# Patient Record
Sex: MALE | Race: WHITE
[De-identification: names, ages, dates, MRNs, and addresses within clinical notes are randomized per-mention and may not be internally consistent; named-entity substitution may affect disease eponyms.]

---

## 2017-02-27 PROBLEM — Z00.00 ENCOUNTER FOR PREVENTIVE HEALTH EXAMINATION: Status: ACTIVE | Noted: 2017-02-27

## 2018-05-29 ENCOUNTER — APPOINTMENT (OUTPATIENT)
Dept: PEDIATRIC ALLERGY IMMUNOLOGY | Facility: CLINIC | Age: 48
End: 2018-05-29
Payer: COMMERCIAL

## 2018-05-29 ENCOUNTER — NON-APPOINTMENT (OUTPATIENT)
Age: 48
End: 2018-05-29

## 2018-05-29 VITALS
WEIGHT: 187.79 LBS | BODY MASS INDEX: 28.46 KG/M2 | DIASTOLIC BLOOD PRESSURE: 78 MMHG | HEART RATE: 68 BPM | SYSTOLIC BLOOD PRESSURE: 118 MMHG | HEIGHT: 68.11 IN

## 2018-05-29 DIAGNOSIS — J30.89 OTHER ALLERGIC RHINITIS: ICD-10-CM

## 2018-05-29 DIAGNOSIS — R21 RASH AND OTHER NONSPECIFIC SKIN ERUPTION: ICD-10-CM

## 2018-05-29 DIAGNOSIS — E78.00 PURE HYPERCHOLESTEROLEMIA, UNSPECIFIED: ICD-10-CM

## 2018-05-29 DIAGNOSIS — H10.13 ACUTE ATOPIC CONJUNCTIVITIS, BILATERAL: ICD-10-CM

## 2018-05-29 DIAGNOSIS — J30.1 ALLERGIC RHINITIS DUE TO POLLEN: ICD-10-CM

## 2018-05-29 DIAGNOSIS — R05 COUGH: ICD-10-CM

## 2018-05-29 DIAGNOSIS — J30.9 ALLERGIC RHINITIS, UNSPECIFIED: ICD-10-CM

## 2018-05-29 PROCEDURE — 95004 PERQ TESTS W/ALRGNC XTRCS: CPT

## 2018-05-29 PROCEDURE — 99244 OFF/OP CNSLTJ NEW/EST MOD 40: CPT | Mod: 25

## 2018-05-29 PROCEDURE — 94010 BREATHING CAPACITY TEST: CPT

## 2018-05-29 RX ORDER — CETIRIZINE HYDROCHLORIDE 10 MG/1
10 TABLET, COATED ORAL
Qty: 1 | Refills: 5 | Status: ACTIVE | COMMUNITY
Start: 2018-05-29 | End: 1900-01-01

## 2018-05-29 RX ORDER — AZELASTINE HYDROCHLORIDE 0.5 MG/ML
0.05 SOLUTION/ DROPS OPHTHALMIC TWICE DAILY
Qty: 1 | Refills: 3 | Status: ACTIVE | COMMUNITY
Start: 2018-05-29 | End: 1900-01-01

## 2018-05-29 RX ORDER — HYDROCORTISONE 10 MG/G
1 CREAM TOPICAL TWICE DAILY
Qty: 1 | Refills: 3 | Status: ACTIVE | COMMUNITY
Start: 2018-05-29 | End: 1900-01-01

## 2018-05-29 RX ORDER — ROSUVASTATIN CALCIUM 5 MG/1
5 TABLET, FILM COATED ORAL DAILY
Qty: 90 | Refills: 3 | Status: ACTIVE | COMMUNITY
Start: 2018-05-29

## 2018-05-29 RX ORDER — FLUTICASONE PROPIONATE 50 UG/1
50 SPRAY, METERED NASAL DAILY
Qty: 1 | Refills: 3 | Status: ACTIVE | COMMUNITY
Start: 2018-05-29 | End: 1900-01-01

## 2020-07-23 ENCOUNTER — EMERGENCY (EMERGENCY)
Facility: HOSPITAL | Age: 50
LOS: 1 days | Discharge: HOME | End: 2020-07-23
Attending: EMERGENCY MEDICINE
Payer: COMMERCIAL

## 2020-07-23 VITALS
RESPIRATION RATE: 17 BRPM | OXYGEN SATURATION: 96 % | WEIGHT: 195.11 LBS | DIASTOLIC BLOOD PRESSURE: 86 MMHG | HEART RATE: 82 BPM | TEMPERATURE: 98 F | HEIGHT: 67 IN | SYSTOLIC BLOOD PRESSURE: 124 MMHG

## 2020-07-23 PROCEDURE — 99283 EMERGENCY DEPT VISIT LOW MDM: CPT

## 2020-07-23 NOTE — ED ADULT TRIAGE NOTE - PAIN RATING/NUMBER SCALE (0-10): ACTIVITY

## 2020-07-23 NOTE — ED ADULT TRIAGE NOTE - CHIEF COMPLAINT QUOTE
bug bite x 2 weeks from unknown insect. Took bactrim x 10 days, finished course. Redness started at site of bug bite after finishing course.

## 2020-07-24 NOTE — ED PROVIDER NOTE - NSFOLLOWUPINSTRUCTIONS_ED_ALL_ED_FT
Cellulitis    Cellulitis is a skin infection caused by bacteria. This condition occurs most often in the arms and lower legs but can occur anywhere over the body. Symptoms include redness, swelling, warm skin, tenderness, and chills/fever. If you were prescribed an antibiotic medicine, take it as told by your health care provider. Do not stop taking the antibiotic even if you start to feel better.    SEEK IMMEDIATE MEDICAL CARE IF YOU HAVE ANY OF THE FOLLOWING SYMPTOMS: worsening fever, red streaks coming from affected area, vomiting or diarrhea, or dizziness/lightheadedness.     -Please take doxycycline 100mg twice a day for 1 wk AND cephalexin 500mg 4 x a day for 1 wk    -Please follow up with your Primary Care Doctor within 1 wk

## 2020-07-24 NOTE — ED ADULT NURSE NOTE - OBJECTIVE STATEMENT
49 YO male with PMH    via walk in presenting with complaints of redness.   Pt states that 2 weeks ago, pt noticed redness to the left lateral side of thigh. Pt suspects that he was bit by a bug, but of unknown bug. Pt states that he was prescribed Levofloxacin, where he developed allergic reaction described as rash to antibiotic, which was relieved by Benadryl. Pt was then prescribed Bactrim by dermatologist, and finished his course, which then relieved rash. Pt states that today, when he noted a return of redness to the left thigh, pt saw PA at dermatologist, and was prescribed Keflex.   Pt had COVID antibiotic testing today. Pt denies chest pain, shortness of breath, visual disturbances, numbness/tingling, fever, chills, diaphoresis, headache, nausea, vomiting, constipation, diarrhea, or urinary symptoms.    Pt Axox4, gross neuro intact, PERRL 3 mm. Lungs clear throughout bilateral. S1S2 heard. Abdomen soft, non-tender, non-distended. Skin warm, dry, and intact, besides redness, swelling, and itching to the lateral aspect of the left thigh. Pt placed in position of comfort. Pt educated on call bell system and provided call bell. Bed in lowest position, wheels locked, appropriate side rails raised. Pt denies needs at this time.

## 2020-07-24 NOTE — ED PROVIDER NOTE - NS ED ROS FT
GENERAL: denies fever, chills  HEENT: denies headaches, dizziness, ear pain, vision changes, congestion, dysphagia  CARDIAC: denies chest pain, palpitations  PULM: denies SOB, cough  GI: denies abdominal pain, nausea, vomiting, diarrhea, constipation, hematochezia  : denies dysuria, frequency, incontinence, hematuria  NEURO: denies motor weakness, sensory changes  MSK: denies joint, muscle pain  SKIN: +new rashes; denies hives  HEME: denies active bleeding, bruising

## 2020-07-24 NOTE — ED PROVIDER NOTE - OBJECTIVE STATEMENT
51y/o M w/ no medical problems p/w rash. Pt states he noticed "bug bite" on R leg he noticed, called PMD who prescribed fluoroquinolone but pt developed hives on arm. Pt then took 10d course of bactrim and rash disappeared quickly but now returning. Saw dermatologist who prescribed doxycycline and tested for Lyme, awaiting results. PT was also given cephalexin but did not take meds. Denies fevers, chills, joint aches, palpitations, chest pain, sob, abdominal pain, numbness, tingling, facial droop.

## 2020-07-24 NOTE — ED PROVIDER NOTE - PHYSICAL EXAMINATION
GENERAL: well appearing in no acute distress, non-toxic appearing  HEAD: normocephalic, atraumatic  HEENT: normal conjunctiva, oral mucosa moist, uvula midline, no tonsilar exudates, neck supple, no JVD  CARDIAC: regular rate and rhythm, normal S1S2, no appreciable murmurs, 2+ pulses in UE/LE b/l  PULM: normal breath sounds, clear to ascultation bilaterally, no rales, rhonchi, wheezing  GI: abdomen nondistended, soft, nontender, no guarding, rebound tenderness  : no CVA tenderness b/l, no suprapubic tenderness  NEURO: no focal motor or sensory deficits, CN2-12 intact, normal speech, PERRLA, EOMI, normal gait, AAOx3  MSK: no peripheral edema, no calf tenderness b/l  SKIN: erythematous blanching rash covering most of R thigh well-perfused  PSYCH: appropriate mood and affect

## 2020-07-24 NOTE — ED PROVIDER NOTE - CLINICAL SUMMARY MEDICAL DECISION MAKING FREE TEXT BOX
51y/o M w/ no medical problems p/w rash on R thigh s/p abx course with no evidence of abscess. Well appearing and hemodynamically stable. Will d/c home with current medications.

## 2020-07-24 NOTE — ED PROVIDER NOTE - ATTENDING CONTRIBUTION TO CARE
Attending Statement (FRANKI Zuniga MD):    HPI: 51y/o M with no reported PMH, presenting with redness of the right outer thigh; initially improved after taking bactrim but now recurred; warm to touch; no fever/chills, able to walk normally on leg without difficulty.  No weakness/numbness.  No h/o DM. No pus draining from leg.    Review of Systems:  -General: no fever or chills  -ENT: no congestion, no difficulty swallowing  -Pulmonary: no cough, no shortness of breath  -Cardiac: no chest pain, no palpitations  -Gastrointestinal: no abdominal pain, no nausea, no vomiting, and no diarrhea.  -Genitourinary: no blood or pain with urination  -Musculoskeletal: no back or neck pain  -Skin: see hpi  -Endocrine: No h/o diabetes or thyroid disease  -Neurologic: No focal weakness or numbness    PSH/PMH as noted above    On Physical Exam:  General: well appearing, in NAD, speaking clearly in full sentences and without difficulty; cooperative with exam  HEENT: airway patent  Skin: erythema of right lateral thigh; no fluctuance, no crepitance, minimally tender; ~7x3cm region, no streaking beyond erythematous region; no bullae or vesicles, no sloughing of skin, no petechiae.  Extremities: no peripheral edema, no gross deformities; FROM of R hip and knee.  Neuro: no gross neurologic deficits    MDM: cellulitis; refractory / partial response to bactrim; was prescribed keflex and doxycyline earlier today by his outpatient providers; not febrile or appearing septic - stable for dc, continue prescribed antibiotics, return precautions advised for any increase in redness, worsening pain, fever, or other symptoms.  Results of ED evaluation d/w patient, who verbalizes understanding of ED evaluation and discharge plan including medications, follow-up instructions and return precautions.

## 2020-08-03 PROBLEM — Z00.00 ENCOUNTER FOR PREVENTIVE HEALTH EXAMINATION: Status: ACTIVE | Noted: 2020-08-03

## 2020-08-20 ENCOUNTER — LABORATORY RESULT (OUTPATIENT)
Age: 50
End: 2020-08-20

## 2020-08-20 ENCOUNTER — APPOINTMENT (OUTPATIENT)
Dept: INFECTIOUS DISEASE | Facility: CLINIC | Age: 50
End: 2020-08-20
Payer: COMMERCIAL

## 2020-08-20 VITALS
BODY MASS INDEX: 30.92 KG/M2 | HEIGHT: 67 IN | SYSTOLIC BLOOD PRESSURE: 122 MMHG | WEIGHT: 197 LBS | OXYGEN SATURATION: 96 % | RESPIRATION RATE: 14 BRPM | TEMPERATURE: 98.2 F | DIASTOLIC BLOOD PRESSURE: 83 MMHG | HEART RATE: 66 BPM

## 2020-08-20 DIAGNOSIS — Z78.9 OTHER SPECIFIED HEALTH STATUS: ICD-10-CM

## 2020-08-20 DIAGNOSIS — A69.20 LYME DISEASE, UNSPECIFIED: ICD-10-CM

## 2020-08-20 DIAGNOSIS — B60.0 BABESIOSIS: ICD-10-CM

## 2020-08-20 DIAGNOSIS — Z20.828 CONTACT WITH AND (SUSPECTED) EXPOSURE TO OTHER VIRAL COMMUNICABLE DISEASES: ICD-10-CM

## 2020-08-20 DIAGNOSIS — Z80.0 FAMILY HISTORY OF MALIGNANT NEOPLASM OF DIGESTIVE ORGANS: ICD-10-CM

## 2020-08-20 DIAGNOSIS — E78.5 HYPERLIPIDEMIA, UNSPECIFIED: ICD-10-CM

## 2020-08-20 PROCEDURE — 99203 OFFICE O/P NEW LOW 30 MIN: CPT

## 2020-08-20 RX ORDER — ROSUVASTATIN CALCIUM 5 MG/1
5 TABLET, FILM COATED ORAL
Refills: 0 | Status: ACTIVE | COMMUNITY
Start: 2020-08-20

## 2020-08-21 LAB
BASOPHILS # BLD AUTO: 0.08 K/UL
BASOPHILS NFR BLD AUTO: 1.1 %
EOSINOPHIL # BLD AUTO: 0.23 K/UL
EOSINOPHIL NFR BLD AUTO: 3.1 %
HCT VFR BLD CALC: 50.2 %
HGB BLD-MCNC: 16.1 G/DL
IMM GRANULOCYTES NFR BLD AUTO: 0.7 %
LYMPHOCYTES # BLD AUTO: 2.21 K/UL
LYMPHOCYTES NFR BLD AUTO: 30.2 %
MAN DIFF?: NORMAL
MCHC RBC-ENTMCNC: 29.9 PG
MCHC RBC-ENTMCNC: 32.1 GM/DL
MCV RBC AUTO: 93.3 FL
MONOCYTES # BLD AUTO: 0.77 K/UL
MONOCYTES NFR BLD AUTO: 10.5 %
NEUTROPHILS # BLD AUTO: 3.99 K/UL
NEUTROPHILS NFR BLD AUTO: 54.4 %
PLATELET # BLD AUTO: 245 K/UL
RBC # BLD: 5.38 M/UL
RBC # FLD: 12.3 %
SARS-COV-2 IGG SERPL IA-ACNC: <0.1 INDEX
SARS-COV-2 IGG SERPL QL IA: NEGATIVE
WBC # FLD AUTO: 7.33 K/UL

## 2020-08-21 NOTE — ASSESSMENT
[FreeTextEntry1] : 50M with HLD came to office for recent lyme disease. He went hiking and biking in [place] and 2 weeks later he noticed a bite on his thigh.\par The picture he showed me on his phone is quite typical of erythema migrans and he does have enough IgM bands on western blot for probable acute lyme as well as going to an endemic area for lyme dz. He has taken almost one month of doxycycline which is the drug of choice for Borrelia burgdorferi (organism that causes lyme) and he is asymptomatic today .\par The ticks that carry lyme sometimes carry additional tick borne illnesses and thus will check for those as well and send CBC to ensure no abnormalities caused by one of those other tick borne illnesses. Advised patient to stop taking doxycycline as he has fully been treated (the typical duration is 10-14 days for acute lyme). 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

## 2020-08-21 NOTE — HISTORY OF PRESENT ILLNESS
[FreeTextEntry1] : 50M cames william to evaluate for lyme disease management. 1 month ago patient had a bite on his left thigh. His doctor put him on levaquin developed rash in both arms so was changed to Bactrim, then went to keflex and doxy [at this point he has been on 2 weeks kelfex(completed) and 4 weeks doxy)]. He had labs done which showed 4 IgM ab for lyme and 4 IgG ab. He feels well today without complaints.no fever no palpitations, no joint pain, no muscle pain.  Denies Gi, , resp issues \par Only medication is crestor 5mg \par Social:\par no smoking\par social etoh\par no drugs\par \par FH: mom: healthy alive \par Father Dm, colon ca, bladder ca

## 2020-08-21 NOTE — PHYSICAL EXAM
[General Appearance - Alert] : alert [General Appearance - In No Acute Distress] : in no acute distress [Sclera] : the sclera and conjunctiva were normal [PERRL With Normal Accommodation] : pupils were equal in size, round, reactive to light [Extraocular Movements] : extraocular movements were intact [Outer Ear] : the ears and nose were normal in appearance [Oropharynx] : the oropharynx was normal with no thrush [Neck Appearance] : the appearance of the neck was normal [Jugular Venous Distention Increased] : there was no jugular-venous distention [Neck Cervical Mass (___cm)] : no neck mass was observed [Auscultation Breath Sounds / Voice Sounds] : lungs were clear to auscultation bilaterally [Thyroid Diffuse Enlargement] : the thyroid was not enlarged [Bowel Sounds] : normal bowel sounds [Edema] : there was no peripheral edema [Full Pulse] : the pedal pulses are present [Abdomen Soft] : soft [Abdomen Tenderness] : non-tender [Costovertebral Angle Tenderness] : no CVA tenderness [Abdomen Mass (___ Cm)] : no abdominal mass palpated [Skin Color & Pigmentation] : normal skin color and pigmentation [No Palpable Adenopathy] : no palpable adenopathy [] : no rash [Skin Lesions] : no skin lesions [No Focal Deficits] : no focal deficits [Oriented To Time, Place, And Person] : oriented to person, place, and time [Affect] : the affect was normal

## 2020-08-27 PROBLEM — B60.0 BABESIOSIS: Status: ACTIVE | Noted: 2020-08-27

## 2020-08-27 LAB
A PHAGOCYTOPH IGG TITR SER IF: NORMAL TITER
B BURGDOR AB SER QL IA: POSITIVE
B MICROTI IGG TITR SER: ABNORMAL TITER
E CHAFFEENSIS IGG TITR SER IF: NORMAL TITER

## 2020-08-31 PROBLEM — Z20.828 SUSPECTED COVID-19 VIRUS INFECTION: Status: ACTIVE | Noted: 2020-08-31

## 2020-09-01 ENCOUNTER — APPOINTMENT (OUTPATIENT)
Dept: INFECTIOUS DISEASE | Facility: CLINIC | Age: 50
End: 2020-09-01

## 2020-09-01 ENCOUNTER — LABORATORY RESULT (OUTPATIENT)
Age: 50
End: 2020-09-01

## 2020-09-02 LAB
ALBUMIN SERPL ELPH-MCNC: 4.9 G/DL
ALP BLD-CCNC: 55 U/L
ALT SERPL-CCNC: 26 U/L
ANION GAP SERPL CALC-SCNC: 15 MMOL/L
AST SERPL-CCNC: 23 U/L
BASOPHILS # BLD AUTO: 0.1 K/UL
BASOPHILS NFR BLD AUTO: 1.2 %
BILIRUB SERPL-MCNC: 0.7 MG/DL
BUN SERPL-MCNC: 11 MG/DL
CALCIUM SERPL-MCNC: 10.2 MG/DL
CHLORIDE SERPL-SCNC: 105 MMOL/L
CO2 SERPL-SCNC: 26 MMOL/L
CREAT SERPL-MCNC: 0.96 MG/DL
EOSINOPHIL # BLD AUTO: 0.34 K/UL
EOSINOPHIL NFR BLD AUTO: 4 %
GLUCOSE SERPL-MCNC: 105 MG/DL
HCT VFR BLD CALC: 50.3 %
HGB BLD-MCNC: 16.4 G/DL
IMM GRANULOCYTES NFR BLD AUTO: 0.6 %
LYMPHOCYTES # BLD AUTO: 1.98 K/UL
LYMPHOCYTES NFR BLD AUTO: 23.6 %
MAN DIFF?: NORMAL
MCHC RBC-ENTMCNC: 30.1 PG
MCHC RBC-ENTMCNC: 32.6 GM/DL
MCV RBC AUTO: 92.3 FL
MONOCYTES # BLD AUTO: 0.69 K/UL
MONOCYTES NFR BLD AUTO: 8.2 %
NEUTROPHILS # BLD AUTO: 5.24 K/UL
NEUTROPHILS NFR BLD AUTO: 62.4 %
PLATELET # BLD AUTO: 223 K/UL
POTASSIUM SERPL-SCNC: 4.5 MMOL/L
PROT SERPL-MCNC: 6.9 G/DL
RBC # BLD: 5.45 M/UL
RBC # FLD: 12.5 %
SARS-COV-2 IGG SERPL IA-ACNC: <3.8 AU/ML
SARS-COV-2 IGG SERPL QL IA: NEGATIVE
SODIUM SERPL-SCNC: 145 MMOL/L
WBC # FLD AUTO: 8.4 K/UL

## 2020-09-12 LAB
A PHAGOCYTOPH IGG TITR SER IF: NORMAL TITER
B BURGDOR AB SER QL IA: POSITIVE
B DIV+MO-1 18S RRNA BLD QL NAA+NON-PROBE: NEGATIVE
B DUNCANI 18S RRNA BLD QL NAA+NON-PROBE: NEGATIVE
B MICROTI 18S RRNA BLD QL NAA+NON-PROBE: NEGATIVE
B MICROTI AB SER QL: NORMAL
B MICROTI IGG TITR SER: ABNORMAL TITER
BABESIA ANTIBODIES, IGG: NORMAL
BABESIA ANTIBODIES, IGM: NORMAL
E CHAFFEENSIS IGG TITR SER IF: NORMAL TITER

## 2021-12-06 ENCOUNTER — APPOINTMENT (OUTPATIENT)
Dept: CT IMAGING | Facility: CLINIC | Age: 51
End: 2021-12-06

## 2021-12-15 ENCOUNTER — APPOINTMENT (OUTPATIENT)
Dept: CT IMAGING | Facility: CLINIC | Age: 51
End: 2021-12-15

## 2022-05-18 NOTE — ED ADULT NURSE NOTE - CAS EDN DISCHARGE INTERVENTIONS
[facility] 75  coding opportunities       Chart reviewed, no opportunity found:   [address]        Patients Insurance     Medicare Insurance: [employer] Ad[employer] NA
